# Patient Record
Sex: MALE | Race: WHITE | Employment: FULL TIME | URBAN - METROPOLITAN AREA
[De-identification: names, ages, dates, MRNs, and addresses within clinical notes are randomized per-mention and may not be internally consistent; named-entity substitution may affect disease eponyms.]

---

## 2020-02-27 ENCOUNTER — APPOINTMENT (OUTPATIENT)
Dept: GENERAL RADIOLOGY | Facility: CLINIC | Age: 60
End: 2020-02-27
Attending: EMERGENCY MEDICINE
Payer: OTHER MISCELLANEOUS

## 2020-02-27 ENCOUNTER — HOSPITAL ENCOUNTER (EMERGENCY)
Facility: CLINIC | Age: 60
Discharge: HOME | End: 2020-02-27
Attending: EMERGENCY MEDICINE | Admitting: EMERGENCY MEDICINE
Payer: OTHER MISCELLANEOUS

## 2020-02-27 VITALS
SYSTOLIC BLOOD PRESSURE: 149 MMHG | HEIGHT: 68 IN | RESPIRATION RATE: 14 BRPM | OXYGEN SATURATION: 97 % | HEART RATE: 94 BPM | BODY MASS INDEX: 24.25 KG/M2 | WEIGHT: 160 LBS | TEMPERATURE: 97.9 F | DIASTOLIC BLOOD PRESSURE: 81 MMHG

## 2020-02-27 DIAGNOSIS — S22.32XA CLOSED FRACTURE OF ONE RIB OF LEFT SIDE, INITIAL ENCOUNTER: ICD-10-CM
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
## 2020-02-27 PROCEDURE — 99283 EMERGENCY DEPT VISIT LOW MDM: CPT

## 2020-02-27 PROCEDURE — 25000132 ZZH RX MED GY IP 250 OP 250 PS 637: Performed by: EMERGENCY MEDICINE

## 2020-02-27 PROCEDURE — 71101 X-RAY EXAM UNILAT RIBS/CHEST: CPT | Mod: LT

## 2020-02-27 RX ORDER — IBUPROFEN 600 MG/1
600 TABLET, FILM COATED ORAL ONCE
Status: COMPLETED | OUTPATIENT
Start: 2020-02-27 | End: 2020-02-27

## 2020-02-27 RX ORDER — OXYCODONE HYDROCHLORIDE 5 MG/1
5 TABLET ORAL EVERY 6 HOURS PRN
Qty: 10 TABLET | Refills: 0 | Status: SHIPPED | OUTPATIENT
Start: 2020-02-27

## 2020-02-27 RX ORDER — OXYCODONE AND ACETAMINOPHEN 5; 325 MG/1; MG/1
1 TABLET ORAL ONCE
Status: COMPLETED | OUTPATIENT
Start: 2020-02-27 | End: 2020-02-27

## 2020-02-27 RX ADMIN — IBUPROFEN 600 MG: 600 TABLET ORAL at 22:12

## 2020-02-27 RX ADMIN — OXYCODONE HYDROCHLORIDE AND ACETAMINOPHEN 1 TABLET: 5; 325 TABLET ORAL at 22:12

## 2020-02-27 ASSESSMENT — MIFFLIN-ST. JEOR: SCORE: 1520.26

## 2020-02-27 NOTE — ED AVS SNAPSHOT
Emergency Department  [address] [place] [address]  Phone:  [phone]  Fax:  [phone]                                    Chilo Zimmer   MRN: [id number]    Department:   Emergency Department   Date of Visit:  2/27/2020           After Visit Summary Signature Page    I have received my discharge instructions, and my questions have been answered. I have discussed any challenges I see with this plan with the nurse or doctor.    ..........................................................................................................................................  Patient/Patient Representative Signature      ..........................................................................................................................................  Patient Representative Print Name and Relationship to Patient    ..................................................               ................................................  Date                                   Time    ..........................................................................................................................................  Reviewed by Signature/Title    ...................................................              ..............................................  Date                                               Time          22EPIC Rev 08/18

## 2020-02-28 NOTE — ED PROVIDER NOTES
"  History     Chief Complaint:  Fall       HPI   Chilo Zimmer is a 59 year old male who presents with fall. The patient slipped on ice earlier today and fell on his back. The patient did not hit his head or lose consciousness. He complains of pain in his left upper back and left shoulder. The patient took 2 Advil earlier today for his pain. He denies any difficulty breathing but has increased pain with deep breathing. He denies any other injures or pain.     Allergies:  No Known Allergies     Medications:    Medications reviewed. No current medications.     Past Medical History:    History reviewed. No pertinent past medical history.    Past Surgical History:    History reviewed. No pertinent surgical history.     Family History:    Family history reviewed. No pertinent family history.      Social History:  The patient was accompanied to the ED by mother and son.  Smoking Status: former smoker  Smokeless Tobacco: current user  Alcohol Use: Yes  Drug Use: No    Review of Systems   Musculoskeletal:        Left upper back pain   Left shoulder pain    All other systems reviewed and are negative.      Physical Exam     Patient Vitals for the past 24 hrs:   BP Temp Temp src Pulse Resp SpO2 Height Weight   02/27/20 2120 (!) 149/81 97.9  F (36.6  C) Oral 94 14 97 % 1.727 m (5' 8\") 72.6 kg (160 lb)        Physical Exam  Eyes:  The pupils are equal and round    Conjunctivae and sclerae are normal  ENT:    The nose is normal    Pinnae are normal    The oropharynx is normal  Neck:  Normal range of motion    There is no rigidity noted    There is no midline cervical spine tenderness  CV:  Regular rate and rhythm     No edema  Resp:  Lungs are clear. Equal breath sounds    Non-labored  GI:  Abdomen is soft and non-tender, there is no rigidity  MS:  Normal muscular tone    No asymmetric leg swelling    Tenderness of the left thoracic back area just below scapula. Possible fracture palpated. No crepitus  Skin:  No rash or acute skin " lesions noted  Neuro:   Awake, alert.      Speech is normal and fluent.    Face is symmetric.     Moves all extremities      Emergency Department Course     Imaging:  Radiology findings were communicated with the patient and family who voiced understanding of the findings.    Ribs XR, unilat 3 views + PA chest,  left  Final Result  IMPRESSION: Heart size and pulmonary vascularity normal. The lungs are clear. Equivocal subtle fracture involving the posterolateral left ninth rib. No other findings for fracture.   Reading per radiology     Interventions:  2212 Ibuprofen 600 mg oral   2212 Percocet 1 tablet oral     Emergency Department Course:  Past medical records, nursing notes, and vitals reviewed.    2205 I performed an exam of the patient as documented above.    The patient was sent for a left rib and chest xray while in the emergency department, results above.       2315 Patient rechecked and updated.       Findings and plan explained to the Patient. Patient discharged home with instructions regarding supportive care, medications, and reasons to return. The importance of close follow-up was reviewed. The patient was prescribed oxycodone.       Impression & Plan     Medical Decision Making:  Chilo Zimmer is a 59 year old male who presents to the emergency department today with fall and back injury.  He fell earlier in the day today and was able to complete his shift at work.  When he got home he started having significant spasms.  Suspect that he has a fracture of 1 of his ribs based on exam.  X-ray does reveal a likely nondisplaced fracture of the ninth rib.  No pneumothorax identified.  Explained to patient that it is important that he continues to take regular deep breaths so he does not develop pneumonia.  He should return to the emergency department if he has severe increase in pain, difficulty breathing, dizziness or syncope.  He is given medications for pain at home to use for increases in pain.  He can use  Tylenol and ibuprofen otherwise.  Return with any new or concerning symptoms.      Discharge Diagnosis:    ICD-10-CM    1. Closed fracture of one rib of left side, initial encounter S22.32XA    2. Fall, initial encounter W19.XXXA        Disposition:  Discharged to home.    Discharge Medications:  Discharge Medication List as of 2/27/2020 11:13 PM      START taking these medications    Details   oxyCODONE (ROXICODONE) 5 MG tablet Take 1 tablet (5 mg) by mouth every 6 hours as needed for pain, Disp-10 tablet, R-0, Local Print             Scribe Disclosure:  I, Aramis Astorga, am serving as a scribe at 10:05 PM on 2/27/2020 to document services personally performed by Urban Cespedes MD based on my observations and the provider's statements to me.      2/27/2020   Urban Cespedes MD [place], Aaron Joseph, MD  02/28/20 0015

## 2020-02-28 NOTE — DISCHARGE INSTRUCTIONS

## 2020-02-28 NOTE — ED TRIAGE NOTES
Fell at 1630 today and tonight mid back pain. Denies loss of con and hitting head.    Pt A&O x 3, CMS x 3, ABCD's adequate in triage

## 2020-10-03 ENCOUNTER — HOSPITAL ENCOUNTER (EMERGENCY)
Facility: CLINIC | Age: 60
Discharge: HOME | End: 2020-10-03
Attending: EMERGENCY MEDICINE | Admitting: EMERGENCY MEDICINE
Payer: COMMERCIAL

## 2020-10-03 VITALS
OXYGEN SATURATION: 98 % | HEIGHT: 68 IN | DIASTOLIC BLOOD PRESSURE: 87 MMHG | WEIGHT: 155 LBS | TEMPERATURE: 98.9 F | SYSTOLIC BLOOD PRESSURE: 128 MMHG | HEART RATE: 99 BPM | RESPIRATION RATE: 16 BRPM | BODY MASS INDEX: 23.49 KG/M2

## 2020-10-03 DIAGNOSIS — K59.00 CONSTIPATION, UNSPECIFIED CONSTIPATION TYPE: ICD-10-CM

## 2020-10-03 PROCEDURE — 99283 EMERGENCY DEPT VISIT LOW MDM: CPT

## 2020-10-03 PROCEDURE — 250N000013 HC RX MED GY IP 250 OP 250 PS 637: Performed by: EMERGENCY MEDICINE

## 2020-10-03 RX ADMIN — DOCUSATE SODIUM 286 ML: 50 LIQUID ORAL at 09:51

## 2020-10-03 ASSESSMENT — ENCOUNTER SYMPTOMS
FEVER: 0
NAUSEA: 1
CONSTIPATION: 1

## 2020-10-03 ASSESSMENT — MIFFLIN-ST. JEOR: SCORE: 1492.58

## 2020-10-03 NOTE — ED NOTES
Patient reports feeling relief from receiving second enema. A moderate amount of stool was present in the comode bag.

## 2020-10-03 NOTE — ED PROVIDER NOTES
"  History     Chief Complaint:  Constipation    The history is provided by the patient and the spouse.      Chilo Zimmer is a 60 year old male with recent right total knee replacement on 9/24 performed by Dr. Poli Wagner, currently on Oxycodone, who presents with his wife for evaluation of ongoing constipation for the last 9 days. Patient reports post-surgery, he had small stools up until 9 days ago. He states his last dose of Oxycodone was yesterday morning and has tried Miralax, Senokot and suppositories with no improvement for the last two days, prompting his presentation.    Here, patient reports that he last ate a sandwich yesterday around lunch time and spouse notes that patient has since been nauseous. He reports his last colonoscopy was 4 years ago. He notes he feels as though he has to defecate, but just is unable to produce the movements. He denies any fever.    Allergies:  No Known Drug Allergies     Medications:    Oxycodone  Toradol  Aspirin 81 mg  Vistaril  Senokot  Zofran     Past Medical History:    Erectile dysfunction  Osteoarthritis    Past Surgical History:    Right total knee replacement  Bilateral knee arthroscopy  Right cataract removal    Family History:    Father - rheumatoid arthritis, heart disease, hypertension  Mother - renal cell cancer, cataract, diabetes, hyperlipidemia  Brother(s) - arthritis, hypertension  Sister(s) - colon cancer, ALS    Social History:  The patient was accompanied to the ED by wife.  Smoking Status: Former  Smokeless Tobacco: Current user  Alcohol Use: Yes  Drug Use: Never   Marital Status:   [2]     Review of Systems   Constitutional: Negative for fever.   Gastrointestinal: Positive for constipation and nausea.   All other systems reviewed and are negative.    Physical Exam     Patient Vitals for the past 24 hrs:   BP Temp Temp src Pulse Resp SpO2 Height Weight   10/03/20 0823 128/87 98.9  F (37.2  C) Oral 99 16 98 % 1.727 m (5' 8\") 70.3 kg (155 lb) "       Physical Exam  Constitutional: Thin, white male. No respiratory distress.  HENT: No signs of trauma.   Eyes: EOM are normal. Pupils are equal, round, and reactive to light.   Neck: Normal range of motion. No JVD present. No cervical adenopathy.  Cardiovascular: Regular rhythm.  Exam reveals no gallop and no friction rub.    No murmur heard.  Pulmonary/Chest: Bilateral breath sounds normal. No wheezes, rhonchi or rales.  Abdominal: Soft. Mild diffused tenderness. No rebound or guarding. 2+ femoral pulses.  Rectal: Soft, brown stool high up.   Musculoskeletal: No edema. No tenderness. Surgical dressing on right leg.   Lymphadenopathy: No lymphadenopathy.   Neurological: Alert and oriented to person, place, and time. Normal strength. Coordination normal.   Skin: Skin is warm and dry. No rash noted. No erythema.    Emergency Department Course     Interventions:  0858 Soap suds enema Rectal  0951 Pink lady enema 286 mL Rectal    Emergency Department Course:  Past medical records, nursing notes, and vitals reviewed.    (0834)   I performed an exam of the patient as documented above. History obtained from patient and spouse.    (1100)   I rechecked the patient after I was informed that he was able to have a bowel movement. Patient reports that he feels improved. Plan of care discussed and patient will be discharged.     Findings and plan explained to the Patient and spouse. Patient discharged home with instructions regarding supportive care, medications, and reasons to return. The importance of close follow-up was reviewed.     I personally answered all related questions prior to discharge.     Impression & Plan     Medical Decision Making:  Chilo Zimmer is 60 years old who is approximately one week status-post knee replacement. He has been on Oxycodone and unfortunately stopped having a bowel movement about a week ago. His wife started him on some stool softeners two days ago, but he has still had no output and is  now having some nausea and abdominal cramping. On exam, his abdomen has some mild diffused tenderness, but there is no rigidity or rebound. Rectum reveals a lot of soft stool. Patient has had no abdominal surgeries and had a recent colonoscopy and I think this is unlikely to be obstruction from anything other than stool. Patient received two enemas, initially a soap suds and then a pink lady and had great results and feels much better. He will be discharged home. He will continue on the bowel regiment started two days ago.     Diagnosis:    ICD-10-CM    1. Constipation, unspecified constipation type  K59.00        Disposition:  Discharged to home.    Scribe Disclosure:  I, Meli Benoit, am serving as a scribe at 8:39 AM on 10/3/2020 to document services personally performed by Clavin Friedman MD based on my observations and the provider's statements to me.     10/3/2020   [facility] EMERGENCY DEPT       Calvin Friedman MD  10/03/20 1123

## 2020-10-03 NOTE — ED AVS SNAPSHOT
[facility] Emergency Dept  [address] [facility] [address]  Phone: [phone]  Fax: [phone]                                    Chilo Zimmer   MRN: [id number]    Department: [facility] Emergency Dept   Date of Visit: 10/3/2020           After Visit Summary Signature Page    I have received my discharge instructions, and my questions have been answered. I have discussed any challenges I see with this plan with the nurse or doctor.    ..........................................................................................................................................  Patient/Patient Representative Signature      ..........................................................................................................................................  Patient Representative Print Name and Relationship to Patient    ..................................................               ................................................  Date                                   Time    ..........................................................................................................................................  Reviewed by Signature/Title    ...................................................              ..............................................  Date                                               Time          22EPIC Rev 08/18